# Patient Record
Sex: MALE | Race: WHITE | NOT HISPANIC OR LATINO | URBAN - METROPOLITAN AREA
[De-identification: names, ages, dates, MRNs, and addresses within clinical notes are randomized per-mention and may not be internally consistent; named-entity substitution may affect disease eponyms.]

---

## 2019-11-08 ENCOUNTER — EMERGENCY (EMERGENCY)
Facility: HOSPITAL | Age: 84
LOS: 1 days | Discharge: HOME | End: 2019-11-08
Attending: EMERGENCY MEDICINE | Admitting: EMERGENCY MEDICINE
Payer: MEDICARE

## 2019-11-08 VITALS
TEMPERATURE: 97 F | OXYGEN SATURATION: 96 % | HEART RATE: 62 BPM | DIASTOLIC BLOOD PRESSURE: 93 MMHG | RESPIRATION RATE: 18 BRPM | SYSTOLIC BLOOD PRESSURE: 179 MMHG

## 2019-11-08 VITALS
DIASTOLIC BLOOD PRESSURE: 90 MMHG | OXYGEN SATURATION: 97 % | TEMPERATURE: 98 F | WEIGHT: 190.04 LBS | RESPIRATION RATE: 18 BRPM | SYSTOLIC BLOOD PRESSURE: 169 MMHG | HEART RATE: 66 BPM

## 2019-11-08 PROCEDURE — 99282 EMERGENCY DEPT VISIT SF MDM: CPT

## 2019-11-08 NOTE — ED ADULT NURSE NOTE - OBJECTIVE STATEMENT
83 year old Male arrived to the ED alert and oriented x 4 for complication with  suprapubic catheter, pt stated "I was unable to flush my cath, my pain is a 2/10 in the groin area" Pt denies chest pain, SOB, fever, palpitations, weakness, nor any other complications. Attempted to flush Suprapubic Cath was unsuccessful, MD made aware, pt will continue to be monitored and reassessed

## 2019-11-08 NOTE — ED PROVIDER NOTE - NSFOLLOWUPINSTRUCTIONS_ED_ALL_ED_FT
Suprapubic Catheter Home Guide  A suprapubic catheter is a rubber tube used to drain urine from the bladder into a collection bag. The catheter is inserted into the bladder through a small opening in the in the lower abdomen, near the center of the body, above the pubic bone (suprapubic area). There is a tiny balloon filled with germ-free (sterile) water on the end of the catheter that is in the bladder. The balloon helps to keep the catheter in place.  Your suprapubic catheter may need to be replaced every 4–6 weeks, or as often as recommended by your health care provider. The collection bag must be emptied every day and cleaned every 2–3 days. The collection bag can be put beside your bed at night and attached to your leg during the day. You may have a large collection bag to use at night and a smaller one to use during the day.  What are the risks?  Urine flow can become blocked. This can happen if the catheter is not working correctly, or if you have a blood clot in your bladder or in the catheter.Tissue near the catheter may can become irritated and bleed.Bacteria may get into your bladder and cause a urinary tract infection.How do I change the catheter?  Supplies needed     Two pairs of sterile gloves.Catheter.Two syringes.Sterile water.Sterile cleaning solution.Lubricant.Collection bags.Changing the catheter     To replace your catheter, take the following steps:  Drink plenty of fluids during the hours before you plan to change the catheter.  Wash your hands with soap and water. If soap and water are not available, use hand .  Lie on your back and put on sterile gloves.  Clean the skin around the catheter opening using the sterile cleaning solution.  Remove the water from the balloon using a syringe.  Slowly remove the catheter.  Do not pull on the catheter if it seems stuck.Call your health care provider immediately if you have difficulty removing the catheter.Take off the used gloves, and put on a new pair.  Put lubricant on the end of the new catheter that will go into your bladder.  Gently slide the catheter through the opening in your abdomen and into your bladder.  Wait for some urine to start flowing through the catheter. When urine starts to flow through the catheter, use a new syringe to fill the balloon with sterile water.  Attach the collection bag to the end of the catheter. Make sure the connection is tight.  Remove the gloves and wash your hands with soap and water.  How do I care for my skin around the catheter?  Use a clean washcloth and soapy water to clean the skin around your catheter every day. Pat the area dry with a clean towel.  Do not pull on the catheter.Do not use ointment or lotion on this area unless told by your health care provider.Check your skin around the catheter every day for signs of infection. Check for:  Redness, swelling, or pain.Fluid or blood.Warmth.Pus or a bad smell.How do I clean and empty the collection bag?  Clean the collection bag every 2–3 days, or as often as told by your health care provider. To do this, take the following steps:  Wash your hands with soap and water. If soap and water are not available, use hand .Disconnect the bag from the catheter and immediately attach a new bag to the catheter.Empty the used bag completely.Clean the used bag using one of the following methods:  Rinse the used bag with warm water and soap.Fill the bag with water and add 1 tsp of vinegar. Let it sit for about 30 minutes, then empty the bag.Let the bag dry completely, and put it in a clean plastic bag before storing it.Empty the large collection bag every 8 hours. Empty the small collection bag when it is about ? full. To empty your large or small collection bag, take the following steps:  Always keep the bag below the level of the catheter. This keeps urine from flowing backwards into the catheter.Hold the bag over the toilet or another container. Turn the valve (spigot) at the bottom of the bag to empty the urine.  Do not touch the opening of the spigot.Do not let the opening touch the toilet or container.Close the spigot tightly when the bag is empty.What are some general tips?  Image   Always wash your hands before and after caring for your catheter and collection bag. Use a mild, fragrance-free soap. If soap and water are not available, use hand .Clean the catheter with soap and water as often as told by your health care provider.Always make sure there are no twists or curls (kinks) in the catheter tube.Always make sure there are no leaks in the catheter or collection bag.Drink enough fluid to keep your urine clear or pale yellow.Do not take baths, swim, or use a hot tub.When should I seek medical care?  Seek medical care if:  You leak urine.You have redness, swelling, or pain around your catheter opening.You have fluid or blood coming from your catheter opening.Your catheter opening feels warm to the touch.You have pus or a bad smell coming from your catheter opening.You have a fever or chills.Your urine flow slows down.Your urine becomes cloudy or smelly.When should I seek immediate medical care?  Seek immediate medical care if your catheter comes out, or if you have:  Nausea.Back pain.Difficulty changing your catheter.Blood in your urine.No urine flow for 1 hour.This information is not intended to replace advice given to you by your health care provider. Make sure you discuss any questions you have with your health care provider.    Document Released: 09/04/2012 Document Revised: 08/16/2017 Document Reviewed: 08/30/2016  [employer] Patient Education © 2019 [employer]

## 2019-11-08 NOTE — ED ADULT NURSE NOTE - CHPI ED NUR SYMPTOMS NEG
no abdominal distension/no blood in stool/no burning urination/no chills/no nausea/no dysuria/no hematuria/no vomiting/no diarrhea

## 2019-11-08 NOTE — ED PROVIDER NOTE - PATIENT PORTAL LINK FT
You can access the FollowMyHealth Patient Portal offered by [facility] by registering at the following website: [email or web address]. By joining [facility]’s FollowMyHealth portal, you will also be able to view your health information using other applications (apps) compatible with our system.

## 2019-11-08 NOTE — ED ADULT NURSE NOTE - NSIMPLEMENTINTERV_GEN_ALL_ED
Implemented All Universal Safety Interventions:  [place] to call system. Call bell, personal items and telephone within reach. Instruct patient to call for assistance. Room bathroom lighting operational. Non-slip footwear when patient is off stretcher. Physically safe environment: no spills, clutter or unnecessary equipment. Stretcher in lowest position, wheels locked, appropriate side rails in place.

## 2019-11-08 NOTE — ED PROVIDER NOTE - PHYSICAL EXAMINATION
CONSTITUTIONAL: Well-appearing; well-nourished; in no apparent distress.   HEAD: Normocephalic; atraumatic.   EYES:  conjunctiva and sclera clear  ENT: normal nose; no rhinorrhea;  NECK: Supple; full ROM  RESPIRATORY: Breathing easily; no resp difficulty  GI: soft, mild distension, non tender to palpation. suprapubic catheter in place, not draining, clear yellow urine in bag.  EXT: No cyanosis or edema;  SKIN: Normal for age and race; warm; dry; good turgor; no apparent lesions or rash.   NEURO: A & O x 3; face symmetric; grossly unremarkable.   PSYCHOLOGICAL: The patient’s mood and manner are appropriate.

## 2019-11-08 NOTE — ED PROVIDER NOTE - OBJECTIVE STATEMENT
82yo M here w/ suprapubic catheter that stopped draining around 530-6pm.  is visiting from [place] for a  dinner event where he was helping to present an award. He states his care is at [facility] with Urology there, catheter last replaced 1 week ago without issue. denies any preceding hematuria.  has many allergies to abx. denies any pain at this time, did not notice urine becoming darker or more pungent prior to catheter stopping functioning.
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
## 2019-11-13 DIAGNOSIS — T83.198A OTHER MECHANICAL COMPLICATION OF OTHER URINARY DEVICES AND IMPLANTS, INITIAL ENCOUNTER: ICD-10-CM

## 2020-05-28 NOTE — PROGRESS NOTE ADULT - SUBJECTIVE AND OBJECTIVE BOX
Can we send patient a letter that she is overdue for her diabetic follow up and to call our office?   Called to assist with nondraining suprapubic murphy.  Unable to flush.  Took balloon down and advanced tube, still no drainage and resistance with flushing.  Using sterile technique, exchanged with new 16F murphy.  Clear urine yellow draining to bag.  Dispo per ED.